# Patient Record
Sex: FEMALE | Race: WHITE | NOT HISPANIC OR LATINO | URBAN - METROPOLITAN AREA
[De-identification: names, ages, dates, MRNs, and addresses within clinical notes are randomized per-mention and may not be internally consistent; named-entity substitution may affect disease eponyms.]

---

## 2017-04-21 ENCOUNTER — OUTPATIENT (OUTPATIENT)
Dept: OUTPATIENT SERVICES | Facility: HOSPITAL | Age: 72
LOS: 1 days | End: 2017-04-21

## 2017-04-21 DIAGNOSIS — Z00.8 ENCOUNTER FOR OTHER GENERAL EXAMINATION: ICD-10-CM

## 2018-06-13 ENCOUNTER — APPOINTMENT (OUTPATIENT)
Dept: GASTROENTEROLOGY | Facility: CLINIC | Age: 73
End: 2018-06-13

## 2023-02-15 ENCOUNTER — APPOINTMENT (OUTPATIENT)
Dept: SURGERY | Facility: CLINIC | Age: 78
End: 2023-02-15

## 2025-05-12 ENCOUNTER — NON-APPOINTMENT (OUTPATIENT)
Age: 80
End: 2025-05-12

## 2025-05-14 ENCOUNTER — APPOINTMENT (OUTPATIENT)
Dept: SURGERY | Facility: CLINIC | Age: 80
End: 2025-05-14
Payer: MEDICARE

## 2025-05-14 PROCEDURE — 99205K: CUSTOM

## 2025-05-29 ENCOUNTER — OUTPATIENT (OUTPATIENT)
Dept: OUTPATIENT SERVICES | Facility: HOSPITAL | Age: 80
LOS: 1 days | End: 2025-05-29
Payer: MEDICARE

## 2025-05-29 ENCOUNTER — APPOINTMENT (OUTPATIENT)
Dept: MAMMOGRAPHY | Facility: CLINIC | Age: 80
End: 2025-05-29

## 2025-05-29 DIAGNOSIS — R92.8 OTHER ABNORMAL AND INCONCLUSIVE FINDINGS ON DIAGNOSTIC IMAGING OF BREAST: ICD-10-CM

## 2025-05-29 PROCEDURE — 88305 TISSUE EXAM BY PATHOLOGIST: CPT | Mod: 26

## 2025-05-29 PROCEDURE — 19081 BX BREAST 1ST LESION STRTCTC: CPT | Mod: RT

## 2025-05-29 PROCEDURE — 77065 DX MAMMO INCL CAD UNI: CPT | Mod: 26,RT

## 2025-05-29 PROCEDURE — A4648: CPT

## 2025-05-29 PROCEDURE — 88360 TUMOR IMMUNOHISTOCHEM/MANUAL: CPT | Mod: 26

## 2025-05-29 PROCEDURE — 77065 DX MAMMO INCL CAD UNI: CPT

## 2025-05-29 PROCEDURE — 88305 TISSUE EXAM BY PATHOLOGIST: CPT

## 2025-05-29 PROCEDURE — 19081 BX BREAST 1ST LESION STRTCTC: CPT

## 2025-05-29 PROCEDURE — 88360 TUMOR IMMUNOHISTOCHEM/MANUAL: CPT

## 2025-06-02 LAB — SURGICAL PATHOLOGY STUDY: SIGNIFICANT CHANGE UP

## 2025-06-05 ENCOUNTER — OUTPATIENT (OUTPATIENT)
Dept: OUTPATIENT SERVICES | Facility: HOSPITAL | Age: 80
LOS: 1 days | End: 2025-06-05
Payer: MEDICARE

## 2025-06-05 ENCOUNTER — APPOINTMENT (OUTPATIENT)
Dept: MRI IMAGING | Facility: IMAGING CENTER | Age: 80
End: 2025-06-05

## 2025-06-05 DIAGNOSIS — Z00.8 ENCOUNTER FOR OTHER GENERAL EXAMINATION: ICD-10-CM

## 2025-06-05 PROCEDURE — C8937: CPT

## 2025-06-05 PROCEDURE — A9585: CPT

## 2025-06-05 PROCEDURE — C8908: CPT

## 2025-06-05 PROCEDURE — 77049 MRI BREAST C-+ W/CAD BI: CPT | Mod: 26

## 2025-06-10 ENCOUNTER — APPOINTMENT (OUTPATIENT)
Dept: MRI IMAGING | Facility: IMAGING CENTER | Age: 80
End: 2025-06-10
Payer: MEDICARE

## 2025-06-10 ENCOUNTER — OUTPATIENT (OUTPATIENT)
Dept: OUTPATIENT SERVICES | Facility: HOSPITAL | Age: 80
LOS: 1 days | End: 2025-06-10
Payer: MEDICARE

## 2025-06-10 DIAGNOSIS — Z00.8 ENCOUNTER FOR OTHER GENERAL EXAMINATION: ICD-10-CM

## 2025-06-10 PROCEDURE — A9585: CPT

## 2025-06-10 PROCEDURE — 77065 DX MAMMO INCL CAD UNI: CPT | Mod: 26,RT

## 2025-06-10 PROCEDURE — A4648: CPT

## 2025-06-10 PROCEDURE — 19085 BX BREAST 1ST LESION MR IMAG: CPT

## 2025-06-10 PROCEDURE — 19086 BX BREAST ADD LESION MR IMAG: CPT | Mod: RT

## 2025-06-10 PROCEDURE — 88305 TISSUE EXAM BY PATHOLOGIST: CPT | Mod: 26

## 2025-06-10 PROCEDURE — 19085 BX BREAST 1ST LESION MR IMAG: CPT | Mod: RT

## 2025-06-10 PROCEDURE — 77065 DX MAMMO INCL CAD UNI: CPT

## 2025-06-10 PROCEDURE — 19086 BX BREAST ADD LESION MR IMAG: CPT

## 2025-06-10 PROCEDURE — 88305 TISSUE EXAM BY PATHOLOGIST: CPT

## 2025-06-11 ENCOUNTER — APPOINTMENT (OUTPATIENT)
Dept: MAMMOGRAPHY | Facility: IMAGING CENTER | Age: 80
End: 2025-06-11

## 2025-06-11 ENCOUNTER — OUTPATIENT (OUTPATIENT)
Dept: OUTPATIENT SERVICES | Facility: HOSPITAL | Age: 80
LOS: 1 days | End: 2025-06-11

## 2025-06-11 VITALS
WEIGHT: 160.06 LBS | SYSTOLIC BLOOD PRESSURE: 144 MMHG | TEMPERATURE: 98 F | RESPIRATION RATE: 20 BRPM | DIASTOLIC BLOOD PRESSURE: 83 MMHG | HEIGHT: 60 IN | HEART RATE: 71 BPM | OXYGEN SATURATION: 98 %

## 2025-06-11 DIAGNOSIS — C50.911 MALIGNANT NEOPLASM OF UNSPECIFIED SITE OF RIGHT FEMALE BREAST: ICD-10-CM

## 2025-06-11 NOTE — H&P PST ADULT - HISTORY OF PRESENT ILLNESS
Pt. is a 80 yo female with a PMHX of glaucoma.  Pt. presents to PST for a malignant neoplasm unspecified site right female breast to be evaluated for right partial mastectomy with seed localization.    Pt. had her annual mammogram last year in 2024.  A mass was detected in the right breast.  Repeat mammogram this year in 2025 revealed a change in the right breast mass.  Biopsy revealed malignancy.

## 2025-06-11 NOTE — H&P PST ADULT - NSICDXPASTMEDICALHX_GEN_ALL_CORE_FT
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

## 2025-06-11 NOTE — H&P PST ADULT - PROBLEM SELECTOR PLAN 1
Pt. is scheduled for a right partial mastectomy with seed localization 6/17/25.  Pt. instructed to use Timolol morning of surgery.  Pt. verbalized understanding of instructions and that Chlorhexidine is for external use.

## 2025-06-11 NOTE — H&P PST ADULT - ASSESSMENT
Pt. is a 80 yo female accompanied by her daughter.  Pt. has malignant neoplasm unspecified site of right female breast.

## 2025-06-11 NOTE — H&P PST ADULT - NSICDXPASTSURGICALHX_GEN_ALL_CORE_FT
PAST SURGICAL HISTORY:  H/O needle biopsy right breast-9/2014    History of laparoscopic cholecystectomy 2011    History of tonsillectomy as a child    S/P laparoscopic appendectomy 9/2014     PAST SURGICAL HISTORY:  H/O needle biopsy right breast-9/2014    H/O resection of liver     History of laparoscopic cholecystectomy 2011    History of tonsillectomy as a child    S/P laparoscopic appendectomy 9/2014

## 2025-06-12 PROBLEM — H40.059 OCULAR HYPERTENSION, UNSPECIFIED EYE: Chronic | Status: ACTIVE | Noted: 2025-06-11

## 2025-06-13 PROBLEM — K80.51 CALCULUS OF BILE DUCT WITHOUT CHOLANGITIS OR CHOLECYSTITIS WITH OBSTRUCTION: Chronic | Status: ACTIVE | Noted: 2025-06-11

## 2025-06-13 PROBLEM — C50.919 MALIGNANT NEOPLASM OF UNSPECIFIED SITE OF UNSPECIFIED FEMALE BREAST: Chronic | Status: ACTIVE | Noted: 2025-06-11

## 2025-06-16 NOTE — ASU PATIENT PROFILE, ADULT - NSICDXPASTSURGICALHX_GEN_ALL_CORE_FT
PAST SURGICAL HISTORY:  H/O needle biopsy right breast-9/2014    H/O resection of liver     History of laparoscopic cholecystectomy 2011    History of tonsillectomy as a child    S/P laparoscopic appendectomy 9/2014

## 2025-06-16 NOTE — ASU PATIENT PROFILE, ADULT - NS TRANSFER DISPOSITION PATIENT BELONGINGS
Spoke to pt on phone for virtual fin apt re benefits; [facility] Medicare ppo policy active with $6100.00 oop/max.  Discussed Part D $2000 oop/max. Discussed Donor bnfts.  Patient had no insurance concerns; provide pt with [facility] contact info.   on unit

## 2025-06-17 ENCOUNTER — APPOINTMENT (OUTPATIENT)
Dept: NUCLEAR MEDICINE | Facility: HOSPITAL | Age: 80
End: 2025-06-17

## 2025-06-17 ENCOUNTER — APPOINTMENT (OUTPATIENT)
Dept: SURGERY | Facility: HOSPITAL | Age: 80
End: 2025-06-17

## 2025-06-17 ENCOUNTER — INPATIENT (INPATIENT)
Facility: HOSPITAL | Age: 80
LOS: 0 days | Discharge: HOME | End: 2025-06-18
Attending: SURGERY | Admitting: SURGERY
Payer: MEDICARE

## 2025-06-17 VITALS
DIASTOLIC BLOOD PRESSURE: 82 MMHG | HEART RATE: 62 BPM | WEIGHT: 160.06 LBS | SYSTOLIC BLOOD PRESSURE: 134 MMHG | OXYGEN SATURATION: 97 % | RESPIRATION RATE: 16 BRPM | HEIGHT: 60 IN | TEMPERATURE: 98 F

## 2025-06-17 DIAGNOSIS — C50.911 MALIGNANT NEOPLASM OF UNSPECIFIED SITE OF RIGHT FEMALE BREAST: ICD-10-CM

## 2025-06-17 PROCEDURE — 88309 TISSUE EXAM BY PATHOLOGIST: CPT | Mod: 26

## 2025-06-17 PROCEDURE — 88331 PATH CONSLTJ SURG 1 BLK 1SPC: CPT | Mod: 26

## 2025-06-17 PROCEDURE — 88307 TISSUE EXAM BY PATHOLOGIST: CPT | Mod: 26

## 2025-06-17 PROCEDURE — 88332 PATH CONSLTJ SURG EA ADD BLK: CPT | Mod: 26

## 2025-06-17 PROCEDURE — 88360 TUMOR IMMUNOHISTOCHEM/MANUAL: CPT | Mod: 26

## 2025-06-17 RX ORDER — SODIUM CHLORIDE 9 G/1000ML
1000 INJECTION, SOLUTION INTRAVENOUS
Refills: 0 | Status: DISCONTINUED | OUTPATIENT
Start: 2025-06-17 | End: 2025-06-17

## 2025-06-17 RX ORDER — LATANOPROST PF 0.05 MG/ML
1 SOLUTION/ DROPS OPHTHALMIC AT BEDTIME
Refills: 0 | Status: DISCONTINUED | OUTPATIENT
Start: 2025-06-17 | End: 2025-06-18

## 2025-06-17 RX ORDER — ONDANSETRON HCL/PF 4 MG/2 ML
4 VIAL (ML) INJECTION ONCE
Refills: 0 | Status: DISCONTINUED | OUTPATIENT
Start: 2025-06-17 | End: 2025-06-17

## 2025-06-17 RX ORDER — TIMOLOL MALEATE 6.8 MG/ML
1 SOLUTION OPHTHALMIC
Refills: 0 | DISCHARGE

## 2025-06-17 RX ORDER — ACETAMINOPHEN 500 MG/5ML
975 LIQUID (ML) ORAL EVERY 6 HOURS
Refills: 0 | Status: DISCONTINUED | OUTPATIENT
Start: 2025-06-17 | End: 2025-06-18

## 2025-06-17 RX ORDER — TIMOLOL MALEATE 6.8 MG/ML
1 SOLUTION OPHTHALMIC DAILY
Refills: 0 | Status: DISCONTINUED | OUTPATIENT
Start: 2025-06-17 | End: 2025-06-18

## 2025-06-17 RX ORDER — ACETAMINOPHEN 500 MG/5ML
650 LIQUID (ML) ORAL EVERY 6 HOURS
Refills: 0 | Status: DISCONTINUED | OUTPATIENT
Start: 2025-06-17 | End: 2025-06-17

## 2025-06-17 RX ORDER — LATANOPROST PF 0.05 MG/ML
1 SOLUTION/ DROPS OPHTHALMIC
Refills: 0 | DISCHARGE

## 2025-06-17 RX ORDER — MELATONIN 5 MG
3 TABLET ORAL AT BEDTIME
Refills: 0 | Status: DISCONTINUED | OUTPATIENT
Start: 2025-06-17 | End: 2025-06-18

## 2025-06-17 RX ORDER — HYDROMORPHONE/SOD CHLOR,ISO/PF 2 MG/10 ML
0.5 SYRINGE (ML) INJECTION
Refills: 0 | Status: DISCONTINUED | OUTPATIENT
Start: 2025-06-17 | End: 2025-06-17

## 2025-06-17 RX ADMIN — LATANOPROST PF 1 DROP(S): 0.05 SOLUTION/ DROPS OPHTHALMIC at 22:27

## 2025-06-17 RX ADMIN — Medication 975 MILLIGRAM(S): at 18:46

## 2025-06-17 NOTE — PACU DISCHARGE NOTE - THE ANESTHESIA ORDERS USED IN THE PACU ORDER SET WILL BE DISCONTINUED UPON TRANSFER OF THIS PATIENT
Statement Selected I [place] out [place] the post office [place], flu-like symptoms w/ fever since yesterday

## 2025-06-17 NOTE — ASU PREOP CHECKLIST - ALLERGY BAND ON
Preventing Falls: Care Instructions  Your Care Instructions    Getting around your home safely can be a challenge if you have injuries or health problems that make it easy for you to fall. Loose rugs and furniture in walkways are among the dangers for many older people who have problems walking or who have poor eyesight. People who have conditions such as arthritis, osteoporosis, or dementia also have to be careful not to fall. You can make your home safer with a few simple measures. Follow-up care is a key part of your treatment and safety. Be sure to make and go to all appointments, and call your doctor if you are having problems. It's also a good idea to know your test results and keep a list of the medicines you take. How can you care for yourself at home? Taking care of yourself  You may get dizzy if you do not drink enough water. To prevent dehydration, drink plenty of fluids, enough so that your urine is light yellow or clear like water. Choose water and other caffeine-free clear liquids. If you have kidney, heart, or liver disease and have to limit fluids, talk with your doctor before you increase the amount of fluids you drink. Exercise regularly to improve your strength, muscle tone, and balance. Walk if you can. Swimming may be a good choice if you cannot walk easily. Have your vision and hearing checked each year or any time you notice a change. If you have trouble seeing and hearing, you might not be able to avoid objects and could lose your balance. Know the side effects of the medicines you take. Ask your doctor or pharmacist whether the medicines you take can affect your balance. Sleeping pills or sedatives can affect your balance. Limit the amount of alcohol you drink. Alcohol can impair your balance and other senses. Ask your doctor whether calluses or corns on your feet need to be removed.  If you wear loose-fitting shoes because of calluses or corns, you can lose your balance and fall.  Talk to your doctor if you have numbness in your feet. Preventing falls at home  Remove raised doorway thresholds, throw rugs, and clutter. Repair loose carpet or raised areas in the floor. Move furniture and electrical cords to keep them out of walking paths. Use nonskid floor wax, and wipe up spills right away, especially on ceramic tile floors. If you use a walker or cane, put rubber tips on it. If you use crutches, clean the bottoms of them regularly with an abrasive pad, such as steel wool. Keep your house well lit, especially stairways, porches, and outside walkways. Use night-lights in areas such as hallways and bathrooms. Add extra light switches or use remote switches (such as switches that go on or off when you clap your hands) to make it easier to turn lights on if you have to get up during the night. Install sturdy handrails on stairways. Move items in your cabinets so that the things you use a lot are on the lower shelves (about waist level). Keep a cordless phone and a flashlight with new batteries by your bed. If possible, put a phone in each of the main rooms of your house, or carry a cell phone in case you fall and cannot reach a phone. Or, you can wear a device around your neck or wrist. You push a button that sends a signal for help. Wear low-heeled shoes that fit well and give your feet good support. Use footwear with nonskid soles. Check the heels and soles of your shoes for wear. Repair or replace worn heels or soles. Do not wear socks without shoes on wood floors. Walk on the grass when the sidewalks are slippery. If you live in an area that gets snow and ice in the winter, sprinkle salt on slippery steps and sidewalks. Preventing falls in the bath  Install grab bars and nonskid mats inside and outside your shower or tub and near the toilet and sinks. Use shower chairs and bath benches. Use a hand-held shower head that will allow you to sit while showering.   Get into a tub or shower by putting the weaker leg in first. Get out of a tub or shower with your strong side first.  Repair loose toilet seats and consider installing a raised toilet seat to make getting on and off the toilet easier. Keep your bathroom door unlocked while you are in the shower. Where can you learn more? Go to http://www.norman.com/. Enter [phone] in the search box to learn more about \"Preventing Falls: Care Instructions. \"  Current as of: March 16, 2018  Content Version: 11.8  © 0500-4432 Healthwise, [employer]. Care instructions adapted under license by [employer] (which disclaims liability or warranty for this information). If you have questions about a medical condition or this instruction, always ask your healthcare professional. [address] any warranty or liability for your use of this information. no known allergies

## 2025-06-17 NOTE — PATIENT PROFILE ADULT - FALL HARM RISK - RISK INTERVENTIONS
Assistance OOB with selected safe patient handling equipment/Assistance with ambulation/Communicate Fall Risk and Risk Factors to all staff, patient, and family/Monitor gait and stability/Reinforce activity limits and safety measures with patient and family/Sit up slowly, dangle for a short time, stand at bedside before walking/Use of alarms - bed, chair and/or voice tab/Visual Cue: Yellow wristband/Bed in lowest position, wheels locked, appropriate side rails in place/Call bell, personal items and telephone in reach/Instruct patient to call for assistance before getting out of bed or chair/Non-slip footwear when patient is out of bed/[place] to call system/Physically safe environment - no spills, clutter or unnecessary equipment/Purposeful Proactive Rounding/Room/bathroom lighting operational, light cord in reach

## 2025-06-17 NOTE — CHART NOTE - NSCHARTNOTEFT_GEN_A_CORE
POST-OPERATIVE NOTE    Subjective:  Patient is s/p R breast mastectomy.   Patient reports pain is controlled. She denies chest pain, shortness of breath, nausea and/or vomiting. Denies any dizziness or lightheadedness.     Vital Signs Last 24 Hrs  T(C): 36.6 (17 Jun 2025 17:29), Max: 36.7 (17 Jun 2025 11:00)  T(F): 97.8 (17 Jun 2025 17:29), Max: 98.1 (17 Jun 2025 11:00)  HR: 81 (17 Jun 2025 17:29) (44 - 81)  BP: 105/60 (17 Jun 2025 17:29) (105/60 - 143/78)  BP(mean): 95 (17 Jun 2025 11:00) (93 - 97)  RR: 17 (17 Jun 2025 17:29) (14 - 26)  SpO2: 94% (17 Jun 2025 17:29) (94% - 100%)    Parameters below as of 17 Jun 2025 17:29  Patient On (Oxygen Delivery Method): room air      I&O's Detail    17 Jun 2025 07:01  -  17 Jun 2025 22:21  --------------------------------------------------------  IN:    Lactated Ringers: 50 mL    Oral Fluid: 660 mL  Total IN: 710 mL    OUT:    Bulb (mL): 75 mL    Voided (mL): 1400 mL  Total OUT: 1475 mL    Total NET: -765 mL        MEDICATIONS    PAST MEDICAL & SURGICAL HISTORY:  Breast mass, right      Obese      Increased pressure in the eye      Multiple obstructing 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 tract      Breast cancer      History of tonsillectomy  as a child      History of laparoscopic cholecystectomy  2011      S/P laparoscopic appendectomy  9/2014      H/O needle biopsy  right breast-9/2014      H/O resection of liver      Physical Exam:  General: NAD, resting comfortably in bed  Pulmonary: Nonlabored breathing, no respiratory distress  Cardiovascular: Regular Rate  Breast: ACE wrap around, soft, no ecchymosis, bandages without strikethrough, RADHA drain serosanguineous, thinning   Abdominal: soft, appropriately tender     LABS:            CAPILLARY BLOOD GLUCOSE      Radiology and Additional Studies:    Assessment:  The patient is a 79y Female with a PMHx of glaucoma who is now s/p a R breast matectomy on 6/17/25.     Plan:  - Tylenol for pain control  - No chemical DVT ppx, ambulate as tolerated  - Regular Diet   - Dressings to be removed in AM  - No labs unless clinically warranted.   - Drain to bulb suction    E Team [id number]

## 2025-06-18 ENCOUNTER — TRANSCRIPTION ENCOUNTER (OUTPATIENT)
Age: 80
End: 2025-06-18

## 2025-06-18 VITALS
HEART RATE: 57 BPM | OXYGEN SATURATION: 100 % | SYSTOLIC BLOOD PRESSURE: 106 MMHG | TEMPERATURE: 98 F | RESPIRATION RATE: 17 BRPM | DIASTOLIC BLOOD PRESSURE: 69 MMHG

## 2025-06-18 RX ORDER — ACETAMINOPHEN 500 MG/5ML
3 LIQUID (ML) ORAL
Qty: 0 | Refills: 0 | DISCHARGE
Start: 2025-06-18

## 2025-06-18 RX ADMIN — Medication 975 MILLIGRAM(S): at 08:05

## 2025-06-18 RX ADMIN — Medication 975 MILLIGRAM(S): at 08:35

## 2025-06-18 NOTE — DISCHARGE NOTE PROVIDER - NSDCMRMEDTOKEN_GEN_ALL_CORE_FT
acetaminophen 325 mg oral tablet: 3 tab(s) orally every 6 hours As needed Mild Pain (1 - 3)  latanoprost 0.005% ophthalmic solution: 1 drop(s) in each eye once a day (at bedtime)  Pt takes several OTC po supplements daily :   timolol (as hemihydrate) 0.5% ophthalmic solution: 1 drop(s) in each affected eye once a day

## 2025-06-18 NOTE — DISCHARGE NOTE PROVIDER - CARE PROVIDER_API CALL
Chani Schwartz  Surgery (General Surgery)  [address], [address]  [place] [address]  Phone: [phone]  Fax: [phone]  Follow Up Time:

## 2025-06-18 NOTE — DISCHARGE NOTE PROVIDER - NSDCFUSCHEDAPPT_GEN_ALL_CORE_FT
Chani Schwartz Physician [facility]  GENSUR [address]  Scheduled Appointment: 06/23/2025

## 2025-06-18 NOTE — DISCHARGE NOTE NURSING/CASE MANAGEMENT/SOCIAL WORK - NSDCPNINST_GEN_ALL_CORE
Notify MD if experiencing fever, nausea, vomiting, increased pain, bleeding, swelling, redness or drainage from incision. Empty and record drain output as instructed by hospital staff.

## 2025-06-18 NOTE — DISCHARGE NOTE PROVIDER - NSDCFUADDINST_GEN_ALL_CORE_FT
ACTIVITY: Full activity, including driving next day as tolerated. No vigorous exercise. Wear bra as desired or tolerated.  BATHING: Remove outer bandage next day. Shower allowed. Pat wound dry.  MEDICATIONS: You may take one or two of the prescribed pain pills every four hours as needed. The discomfort should improve steadily. Switch to Extra-Strength Tylenol or similar medication (aspirin or ibuprofen) as needed.   BLEEDING: After surgery, some blood may escape from under the tape. It is of no consequence. The paper tape may fall off after several days. If not, Dr. Schwartz will remove it in her office.  BRUISING: As the days go by, black and blue areas may become more ticeable. These areas will disappear within several weeks. In addition, the area around the incision may feel very hard and look swollen. It is normal and it may take several months to subside.     CALL OFFICE:  1) If brisk bleeding is occuring through several new bandages  2) If you breast becomes warm or red, with or without pus  3) If you have a fever greater than 101F    FOLLOW UP: Please call Dr. Schwartz's office and make an appointment for approximately one week from now. The number is: [phone]    You are being discharged with a drain in place, teaching provided while in hospital. Please empty, measure, and record drain output, please bring copy of recorded output to follow up appointment

## 2025-06-18 NOTE — DISCHARGE NOTE NURSING/CASE MANAGEMENT/SOCIAL WORK - NSDCPEFALRISK_GEN_ALL_CORE
For information on Fall & Injury Prevention, visit: [email or web address] OR  [email or web address] OR  https://www.cdc.gov/steadi/patient.html

## 2025-06-18 NOTE — DISCHARGE NOTE NURSING/CASE MANAGEMENT/SOCIAL WORK - FINANCIAL ASSISTANCE
[facility] provides services at a reduced cost to those who are determined to be eligible through [facility]’s financial assistance program. Information regarding [facility]’s financial assistance program can be found by going to [email or web address] or by calling [phone].

## 2025-06-18 NOTE — PROGRESS NOTE ADULT - SUBJECTIVE AND OBJECTIVE BOX
E Team Surgery Daily Progress Note  =====================================================    SUBJECTIVE: Patient seen and examined at bedside on AM rounds. Patient with no complaints    ALLERGIES:  No Known Allergies      --------------------------------------------------------------------------------------    MEDICATIONS:    Neurologic Medications  acetaminophen     Tablet .. 975 milliGRAM(s) Oral every 6 hours PRN Mild Pain (1 - 3)  melatonin 3 milliGRAM(s) Oral at bedtime    Respiratory Medications    Cardiovascular Medications    Gastrointestinal Medications    Genitourinary Medications    Hematologic/Oncologic Medications    Antimicrobial/Immunologic Medications    Endocrine/Metabolic Medications    Topical/Other Medications  latanoprost 0.005% Ophthalmic Solution 1 Drop(s) Both EYES at bedtime  timolol 0.5% Solution 1 Drop(s) Both EYES daily    --------------------------------------------------------------------------------------    VITAL SIGNS:  T(C): 36.3 (06-18-25 @ 05:03), Max: 36.7 (06-17-25 @ 11:00)  HR: 58 (06-18-25 @ 05:03) (44 - 81)  BP: 113/51 (06-18-25 @ 05:03) (104/53 - 143/78)  RR: 17 (06-18-25 @ 05:03) (14 - 26)  SpO2: 95% (06-18-25 @ 05:03) (94% - 100%)  --------------------------------------------------------------------------------------    INS AND OUTS:    06-17-25 @ 07:01  -  06-18-25 @ 07:00  --------------------------------------------------------  IN: 950 mL / OUT: 2650 mL / NET: -1700 mL      --------------------------------------------------------------------------------------    Physical Exam:  General: NAD, resting comfortably in bed  Pulmonary: Nonlabored breathing, no respiratory distress  Cardiovascular: Regular Rate  Breast: ACE wrap around, soft, no ecchymosis, bandages without strikethrough, RADHA drain serosanguineous, thinning   Abdominal: soft, appropriately tender     --------------------------------------------------------------------------------------    LABS      Assessment:  79y Female with a PMHx of glaucoma who is now s/p a R breast matectomy on 6/17/25.     Plan:  - Tylenol for pain control  - No chemical DVT ppx, ambulate as tolerated  - Regular Diet   - Dressings removed, spare ACE provided to pt if needed  - No labs unless clinically warranted.   - Drain to bulb suction, please provide drain teaching  - Dispo planning     E Team [id number]

## 2025-06-18 NOTE — DISCHARGE NOTE PROVIDER - HOSPITAL COURSE
78 yo female with a PMHX of glaucoma.  Pt. presents to PST for a malignant neoplasm unspecified site right female breast to be evaluated for right partial mastectomy with seed localization. Pt. had her annual mammogram last year in 2024.  A mass was detected in the right breast.  Repeat mammogram this year in 2025 revealed a change in the right breast mass.  Biopsy revealed malignancy.    Pt admitted to surgical oncology service and take to the OR for scheduled right mastectomy. Pt tolerated procedure well. Post operative course uncomplicated.     Per Attending pt now stable for dc home with drain in place. Pt to follow up with surgeon as an outpatient, instructed to call to schedule appointment.

## 2025-06-18 NOTE — DISCHARGE NOTE NURSING/CASE MANAGEMENT/SOCIAL WORK - PATIENT PORTAL LINK FT
You can access the FollowMyHealth Patient Portal offered by [facility] by registering at the following website: [email or web address]. By joining [facility]’s FollowMyHealth portal, you will also be able to view your health information using other applications (apps) compatible with our system.

## 2025-06-18 NOTE — DISCHARGE NOTE PROVIDER - NSDCHOSPICE_GEN_A_CORE
Informed patient of results and treatment, verbalized understanding.     Prescription sent to pharmacy.    No

## 2025-06-20 LAB — SURGICAL PATHOLOGY STUDY: SIGNIFICANT CHANGE UP

## 2025-06-25 ENCOUNTER — APPOINTMENT (OUTPATIENT)
Dept: SURGERY | Facility: CLINIC | Age: 80
End: 2025-06-25
Payer: MEDICARE

## 2025-06-25 PROCEDURE — 99024 POSTOP FOLLOW-UP VISIT: CPT

## 2025-06-28 ENCOUNTER — OUTPATIENT (OUTPATIENT)
Dept: OUTPATIENT SERVICES | Facility: HOSPITAL | Age: 80
LOS: 1 days | Discharge: HOME | End: 2025-06-28

## 2025-06-28 DIAGNOSIS — C50.919 MALIGNANT NEOPLASM OF UNSPECIFIED SITE OF UNSPECIFIED FEMALE BREAST: ICD-10-CM

## 2025-07-01 ENCOUNTER — APPOINTMENT (OUTPATIENT)
Dept: SURGERY | Facility: CLINIC | Age: 80
End: 2025-07-01
Payer: MEDICARE

## 2025-07-01 ENCOUNTER — NON-APPOINTMENT (OUTPATIENT)
Age: 80
End: 2025-07-01

## 2025-07-01 ENCOUNTER — APPOINTMENT (OUTPATIENT)
Dept: HEMATOLOGY ONCOLOGY | Facility: CLINIC | Age: 80
End: 2025-07-01
Payer: MEDICARE

## 2025-07-01 VITALS
TEMPERATURE: 97.9 F | OXYGEN SATURATION: 98 % | RESPIRATION RATE: 16 BRPM | WEIGHT: 160.93 LBS | DIASTOLIC BLOOD PRESSURE: 79 MMHG | HEART RATE: 86 BPM | HEIGHT: 61.42 IN | BODY MASS INDEX: 30 KG/M2 | SYSTOLIC BLOOD PRESSURE: 130 MMHG

## 2025-07-01 LAB
ALBUMIN SERPL ELPH-MCNC: 4.6 G/DL
ALP BLD-CCNC: 100 U/L
ALT SERPL-CCNC: 37 U/L
ANION GAP SERPL CALC-SCNC: 15 MMOL/L
AST SERPL-CCNC: 26 U/L
BILIRUB SERPL-MCNC: 0.4 MG/DL
BUN SERPL-MCNC: 16 MG/DL
CALCIUM SERPL-MCNC: 10.1 MG/DL
CHLORIDE SERPL-SCNC: 104 MMOL/L
CO2 SERPL-SCNC: 22 MMOL/L
CREAT SERPL-MCNC: 0.58 MG/DL
EGFRCR SERPLBLD CKD-EPI 2021: 93 ML/MIN/1.73M2
GLUCOSE SERPL-MCNC: 105 MG/DL
POTASSIUM SERPL-SCNC: 4.7 MMOL/L
PROT SERPL-MCNC: 7 G/DL
SODIUM SERPL-SCNC: 141 MMOL/L

## 2025-07-01 PROCEDURE — 99205 OFFICE O/P NEW HI 60 MIN: CPT

## 2025-07-01 PROCEDURE — 99024 POSTOP FOLLOW-UP VISIT: CPT

## 2025-07-01 PROCEDURE — G2211 COMPLEX E/M VISIT ADD ON: CPT

## 2025-07-01 RX ORDER — TIMOLOL MALEATE 5 MG/ML
SOLUTION/ DROPS OPHTHALMIC
Refills: 0 | Status: ACTIVE | COMMUNITY

## 2025-07-01 RX ORDER — CHOLECALCIFEROL (VITAMIN D3) 25 MCG
TABLET ORAL
Refills: 0 | Status: ACTIVE | COMMUNITY

## 2025-07-01 RX ORDER — LATANOPROST/PF 0.005 %
0.01 DROPS OPHTHALMIC (EYE)
Refills: 0 | Status: ACTIVE | COMMUNITY

## 2025-07-02 ENCOUNTER — OUTPATIENT (OUTPATIENT)
Dept: OUTPATIENT SERVICES | Facility: HOSPITAL | Age: 80
LOS: 1 days | End: 2025-07-02
Payer: MEDICARE

## 2025-07-02 ENCOUNTER — APPOINTMENT (OUTPATIENT)
Dept: RADIOLOGY | Facility: CLINIC | Age: 80
End: 2025-07-02
Payer: MEDICARE

## 2025-07-02 DIAGNOSIS — Z00.00 ENCOUNTER FOR GENERAL ADULT MEDICAL EXAMINATION WITHOUT ABNORMAL FINDINGS: ICD-10-CM

## 2025-07-02 PROCEDURE — 77080 DXA BONE DENSITY AXIAL: CPT

## 2025-07-02 PROCEDURE — 77080 DXA BONE DENSITY AXIAL: CPT | Mod: 26

## 2025-07-09 ENCOUNTER — APPOINTMENT (OUTPATIENT)
Dept: SURGERY | Facility: CLINIC | Age: 80
End: 2025-07-09
Payer: MEDICARE

## 2025-07-09 PROCEDURE — 99024 POSTOP FOLLOW-UP VISIT: CPT

## 2025-07-16 ENCOUNTER — APPOINTMENT (OUTPATIENT)
Dept: SURGERY | Facility: CLINIC | Age: 80
End: 2025-07-16
Payer: MEDICARE

## 2025-07-16 PROCEDURE — 99024 POSTOP FOLLOW-UP VISIT: CPT

## (undated) DEVICE — Device

## (undated) DEVICE — DRSG COMBINE 5 X 9"

## (undated) DEVICE — SOL IRR POUR H2O 500ML

## (undated) DEVICE — DRAPE WARMING SOLUTION 44 X 44"

## (undated) DEVICE — PACK BREAST MINOR

## (undated) DEVICE — DRAIN RESERVOIR FOR JACKSON PRATT 100CC CARDINAL

## (undated) DEVICE — WARMING BLANKET FULL UNDERBODY

## (undated) DEVICE — SUT SILK 2-0 18" FS

## (undated) DEVICE — POSITIONER STRAP ARMBOARD VELCRO TS-30

## (undated) DEVICE — ELCTR BOVIE PENCIL SMOKE EVACUATION

## (undated) DEVICE — ELCTR GROUNDING PAD ADULT COVIDIEN

## (undated) DEVICE — GLV 6.5 PROTEXIS (WHITE)

## (undated) DEVICE — SUT MONOCRYL 4-0 27" PS-2 UNDYED